# Patient Record
Sex: MALE | Race: WHITE | NOT HISPANIC OR LATINO | Employment: OTHER | URBAN - METROPOLITAN AREA
[De-identification: names, ages, dates, MRNs, and addresses within clinical notes are randomized per-mention and may not be internally consistent; named-entity substitution may affect disease eponyms.]

---

## 2025-06-24 ENCOUNTER — APPOINTMENT (OUTPATIENT)
Dept: ORTHOPEDIC SURGERY | Facility: CLINIC | Age: 65
End: 2025-06-24
Payer: MEDICARE

## 2025-06-30 ENCOUNTER — HOSPITAL ENCOUNTER (EMERGENCY)
Facility: HOSPITAL | Age: 65
Discharge: HOME | End: 2025-06-30
Payer: MEDICARE

## 2025-06-30 ENCOUNTER — APPOINTMENT (OUTPATIENT)
Dept: RADIOLOGY | Facility: HOSPITAL | Age: 65
End: 2025-06-30
Payer: MEDICARE

## 2025-06-30 VITALS
WEIGHT: 165 LBS | DIASTOLIC BLOOD PRESSURE: 81 MMHG | OXYGEN SATURATION: 100 % | TEMPERATURE: 97.3 F | SYSTOLIC BLOOD PRESSURE: 155 MMHG | BODY MASS INDEX: 23.62 KG/M2 | HEIGHT: 70 IN | RESPIRATION RATE: 20 BRPM | HEART RATE: 56 BPM

## 2025-06-30 DIAGNOSIS — S09.90XA CLOSED HEAD INJURY, INITIAL ENCOUNTER: Primary | ICD-10-CM

## 2025-06-30 PROCEDURE — 72125 CT NECK SPINE W/O DYE: CPT | Performed by: RADIOLOGY

## 2025-06-30 PROCEDURE — 72125 CT NECK SPINE W/O DYE: CPT

## 2025-06-30 PROCEDURE — 70450 CT HEAD/BRAIN W/O DYE: CPT

## 2025-06-30 PROCEDURE — 70450 CT HEAD/BRAIN W/O DYE: CPT | Performed by: RADIOLOGY

## 2025-06-30 PROCEDURE — 99284 EMERGENCY DEPT VISIT MOD MDM: CPT | Mod: 25

## 2025-06-30 SDOH — HEALTH STABILITY: MENTAL HEALTH: BEHAVIORAL HEALTH(WDL): EXCEPTIONS TO WDL

## 2025-06-30 SDOH — HEALTH STABILITY: MENTAL HEALTH: BEHAVIORS/MOOD: APPEARS INTOXICATED

## 2025-06-30 ASSESSMENT — PAIN SCALES - GENERAL
PAINLEVEL_OUTOF10: 0 - NO PAIN

## 2025-06-30 ASSESSMENT — LIFESTYLE VARIABLES
PULSE: 56
AGITATION: NORMAL ACTIVITY
NAUSEA AND VOMITING: NO NAUSEA AND NO VOMITING
VISUAL DISTURBANCES: NOT PRESENT
AUDITORY DISTURBANCES: NOT PRESENT
TREMOR: NO TREMOR
HEADACHE, FULLNESS IN HEAD: NOT PRESENT
BLOOD PRESSURE: 155/81
PAROXYSMAL SWEATS: NO SWEAT VISIBLE
ANXIETY: NO ANXIETY, AT EASE
ORIENTATION AND CLOUDING OF SENSORIUM: ORIENTED AND CAN DO SERIAL ADDITIONS
TOTAL SCORE: 0

## 2025-06-30 ASSESSMENT — PAIN DESCRIPTION - PROGRESSION: CLINICAL_PROGRESSION: NOT CHANGED

## 2025-06-30 ASSESSMENT — PAIN - FUNCTIONAL ASSESSMENT: PAIN_FUNCTIONAL_ASSESSMENT: 0-10

## 2025-06-30 NOTE — PROGRESS NOTES
ESTHELA Young, PA-C  Division of Adult Reconstruction  Phone: [phone]  Fax: [phone]    PRIMARY CARE PHYSICIAN: No Assigned PCP Generic Provider, MD  REFERRING PROVIDER: No referring provider defined for this encounter.         JERRY Ratliff is a pleasant 64 y.o. year-old male who is seen today for evaluation of right knee pain. The pain has been present within this last year after he fell directly on the knee at the gym. He rates the pain a 7 out of 10. The patient states that the pain is located in the anterior medial aspect of the knee. The pain is aggravated by activity. The patient has tried activity modifications, Icy hot, and an IA CSI injection at the beginning of April (states this only provided him with 2 weeks of relief) without sufficient relief of symptoms. He does report great instability of the knee and falls often due to this. He ambulates with a cane. The patient denies any history of inflammatory arthritis. The patient denies any numbness or tingling of the side: right lower extremity.    History of knee surgery: No  Functional status: occasionally limited   Instability: Yes  Symptoms impacting sleep: Yes  Impacting quality of life: Yes  Pain level: 7  Back pain: No  Hip/groin pain: No  Radicular symptoms: No    Pertinent PMHx includes: Alcohol abuse with cirrhosis (currently about 3 drinks, 3 days a week) s/p liver transplant, thrombocytopenia, CKD, Hep C virus, DVT, MRSA +, and HTN.    Alcohol Use: Current use with cirrhosis s/p liver transplant in January of 2022  Tobacco Use: No    Review of systems  There has been no interval change in this patient's past medical, surgical, medications, allergies, family history or social history since the most recent visit to a provider within our department. Adult patient history sheet was filled out by the patient today in clinic and will be scanned into the EMR. I personally reviewed this form which will be scanned into the  EMR. 14 point review of systems was performed, reviewed, and negative except for pertinent positives documented in the history of present illness.    Medical History[1]  Problem List[2]  Medication Documentation Review Audit    **Prior to Admission medications have not yet been reviewed**       RX Allergies[3]  Social History     Socioeconomic History    Marital status: Unknown     Spouse name: Not on file    Number of children: Not on file    Years of education: Not on file    Highest education level: Not on file   Occupational History    Not on file   Tobacco Use    Smoking status: Not on file    Smokeless tobacco: Not on file   Substance and Sexual Activity    Alcohol use: Not on file    Drug use: Not on file    Sexual activity: Not on file   Other Topics Concern    Not on file   Social History Narrative    Not on file     Social Drivers of Health     Financial Resource Strain: Not on file   Food Insecurity: No Food Insecurity (5/22/2025)    Received from [facility]    Hunger Vital Sign     Worried About Running Out of Food in the Last Year: Never true     Ran Out of Food in the Last Year: Never true   Transportation Needs: No Transportation Needs (4/28/2025)    Received from [facility]    PRAPARE - Transportation     Lack of Transportation (Medical): No     Lack of Transportation (Non-Medical): No   Physical Activity: Not on file   Stress: Not on file   Social Connections: Not on file   Intimate Partner Violence: Not on file   Housing Stability: Unknown (4/28/2025)    Received from [facility]    Housing Stability Vital Sign     Unable to Pay for Housing in the Last Year: No     Number of Times Moved in the Last Year: Not on file     Homeless in the Last Year: No     Surgical History[4]    Physical Exam  General: Well-appearing male in no acute distress. Awake, alert and oriented. Pleasant and cooperative.  Respiratory: Non-labored breathing  Mood: Euthymic   Gait: Antalgic  Assistive Device:  cane  Skin: warm, dry and intact without lesions    Affected side: right knee:  Tenderness to palpation over the medial and latral joint line.  Effusion: moderate  ROM: 10 - 115  Limb Alignment: Moderate varus  Stable to varus and valgus stress at full extension and 30 degrees of flexion  Quad Strength: 5/5  Hamstring Strength: 5/5  Patella Crepitus: No  Patella Grind Test: No  Sensation: Intact to light touch distally  Motor function: Able to fire TA, EHL, G/S  Pulses: Palpable DP pulse    Imaging  Imaging-4 view xrays of the right knee were independently reviewed and interpreted in clinic today. The findings were reviewed with the patient. There are moderate-severe degenerative changes of the right knee with varus limb alignment. There is Moderate medial joint space narrowing with underlying subchondral sclerosis, and osteophyte formation. No evidence of fracture, AVN, dislocation, osteomyelitis. Chondrocalcinosis present.     Impression/Plan  Garry Ratliff is a 64 y.o. year-old male with Moderate OA of the side: right knee. We reviewed an evidence-based approach to osteoarthritis of the knee. We discussed in detail a treatment plan that He is comfortable with.    I had an in-depth discussion about the nature and natural history of degenerative joint disease. I discussed the etiology of symptoms. I explained that it is progressive, but there is no way to predict how quickly a patient's symptoms will get worse. I discussed non-operative treatments for the patient's osteoarthritis in detail and briefly discussed indications for surgery. I advised that the patient that they can manage their pain symptomatically, with Volataren gel, ice, and Icy-hot. I suggested activity modification as needed when there is a a flare up. I explained to them that the best interventions he  can take to perhaps slow the progression of the degenerative changes in the long-term are maintaining a healthy weight and avoiding joint loading  activities and perform low impact exercise such as cycling, walking, and swimming. The use of a walking stick, cane or other assistive device can help as well.      I also discussed more invasive treatment options including injections. I talked about the risks and benefits of injections, focusing on the fact that there is no way to predict the degree or duration of symptom relief, but that it can provide weeks to months of pain relief in most patients. Should these measures fail, the most invasive option would be joint replacement surgery. The patient should try and delay joint replacement surgery for as long as possible. In addition, his current alcohol use precludes him as a candidate for elective TKA.     Garry Ratliff may one day benefit from an elective total joint replacement however has not yet exhausted other treatment options. I would like the patient to maximize conservative measures at this time as he works on cutting down his alcohol use. I do not see a clear indication to move forward with arthroplasty. I have recommended the following and the patient is in agreement with the plan.  Referral to outpatient PT to work on strengthening and conditioning.  Given the patient's pattern of osteoarthritis, I have recommended a medial  brace.  Patient was prescribed a medial  for right knee osteoarthritis with varus deformity. The patient is ambulatory with or without aid; but, has weakness, instability and/or deformity of their right knee which requires stabilization from this orthosis to improve their function. Verbal and written instructions for the use, wear schedule, cleaning and application of this item were given.  Patient was instructed that should the brace result in increased pain, decreased sensation, increased swelling, or an overall worsening of their medical condition, to please contact our office immediately. Orthotic management and training was provided for skin care,  modifications due to healing tissues, edema changes, interruption in skin integrity, and safety precautions with the orthosis.  Continued conservative management with ice, activity modifications, Voltaren gel.  Provided patient with a card for Dr. Schuler's office to discuss HA injections    All of the patient's questions were answered and he  was comfortable with this plan of care.  Garry Ratliff was encouraged to call if any problems, questions or concerns arise.     Follow-up 1 year PRN with XR of right knee.    ESTHELA Wong, PACoreenC  Orthopaedic Physician Assistant  Division of Adult Reconstruction  Department of Orthopaedics  [place] [address]  Phone: [phone]  Fax: [phone]         [1]   Past Medical History:  Diagnosis Date    Other conditions influencing health status 11/19/2014    Elevated blood pressure   [2] There is no problem list on file for this patient.  [3] Not on File  [4]   Past Surgical History:  Procedure Laterality Date    IR CVC PICC  1/28/2022    IR CVC PICC 1/28/2022    US GUIDED ABDOMINAL PARACENTESIS  12/1/2020    US GUIDED ABDOMINAL PARACENTESIS [facility]

## 2025-07-01 NOTE — ED PROVIDER NOTES
HPI   Chief Complaint   Patient presents with    Alcohol Intoxication       HPI  See my MDM      Patient History   Medical History[1]  Surgical History[2]  Family History[3]  Social History[4]    Physical Exam   ED Triage Vitals [06/30/25 2046]   Temperature Heart Rate Respirations BP   36.3 °C (97.3 °F) 56 20 155/81      Pulse Ox Temp Source Heart Rate Source Patient Position   100 % Oral Monitor Sitting      BP Location FiO2 (%)     Left arm --       Physical Exam  CONSTITUTIONAL: Vital signs reviewed as charted, well-developed and in no distress  Eyes: Extraocular muscles are intact. Pupils equal round and reactive to light. Conjunctiva are pink.    ENT: Mucous membranes are moist. Tongue in the midline. Pharynx was without erythema or exudates, uvula midline  LUNGS: Breath sounds equal and clear to auscultation. Good air exchange, no wheezes rales or retractions, pulse oximetry is charted.  HEART: Regular rate and rhythm without murmur thrill or rub, strong tones, auscultation is normal.  ABDOMEN: Soft and nontender without guarding rebound rigidity or mass. Bowel sounds are present and normal in all quadrants. There is no palpable masses or aneurysms identified. No hepatosplenomegaly, normal abdominal exam.  Neuro: The patient is awake, alert and oriented ×3. Moving all 4 extremities and answering questions appropriately.   MUSCULOSKELETAL: The calves are nontender to palpation. Full gross active range of motion.   PSYCH: Awake alert oriented, normal mood and affect.  Skin:  Dry, normal color, warm to the touch, no rash present.  Patient with some abrasions to the top of his head.      ED Course & MDM   Diagnoses as of 06/30/25 2213   Closed head injury, initial encounter                 No data recorded     Nona Coma Scale Score: 15 (06/30/25 2108 : Vickie Dinh RN)                           Medical Decision Making  History obtained from: patient    Vital signs, nursing notes, current medications, past  medical history, Surgical history, allergies, social history, family History were reviewed.         HPI:  Patient 64-year-old gentleman presenting emergency room today by EMS for evaluation understanding when he fell.  Reportedly EMS sought med control from [facility] who medically pink slipped the patient so he had to come to the emergency room.  Patient admits to drinking 5 beers today.  He does have known history of liver transplant 3 years ago states he normally drinks between 3 and 5 beers daily.  He denies taking anticoagulants.  States he takes his antirejection medications routinely.  He is nontoxic and well-appearing denies loss of consciousness.      10 point ROS was reviewed and negative except Noted above in HPI.  DDX: as listed above          MDM Summary/considerations:  Labs Reviewed - No data to display  CT cervical spine wo IV contrast   Final Result   CT HEAD:   No acute intracranial abnormality or calvarial fracture.   Senescent changes        CT CERVICAL SPINE:   No acute fracture or traumatic malalignment of the cervical spine.   Scattered degenerative changes        Signed by: Damian Sabillon 6/30/2025 9:39 PM   Dictation workstation:   [id number]      CT head wo IV contrast   Final Result   CT HEAD:   No acute intracranial abnormality or calvarial fracture.   Senescent changes        CT CERVICAL SPINE:   No acute fracture or traumatic malalignment of the cervical spine.   Scattered degenerative changes        Signed by: Damian Sabillon 6/30/2025 9:39 PM   Dictation workstation:   [id number]        Medications - No data to display  New Prescriptions    No medications on file       I estimate there is a low risk for subarachnoid hemorrhage, cavernous sinus venous thrombosis, meningitis, intracranial hemorrhage, subdural hematoma, or stroke, thus I considered the discharge disposition reasonable. We have discussed the diagnosis and risks, and we agreed with discharging home to follow-up with her  primary care doctor. We also discussed returning to the emergency department immediately if new or worsening symptoms occur. We have discussed the symptoms which are most concerning such as changing or worsening pain, weakness, vomiting, or fever and necessitate immediate return.      CT scan of the head and neck are grossly unremarkable at this time.  Patient is alert and oriented x 3 answering questions appropriately.  He is able to ambulate without difficulty.  He was discharged home in stable condition with a sober ride.    All of the patient's questions were answered to the best of my ability.  Patient states understanding that they have been screened for an emergency today and we have not found any etiology of symptoms that requires emergent treatment or admission to the hospital at this point. They understand that they have not had definitive care day and require follow-up for treatment of their condition. They also state understanding that they may have an emergent condition that may potentially have not of detected at this visit and they must return to the emergency department if they develop any worsening of symptoms or new complaints.      I have evaluated this patient, my supervising physician was available for consultation.            Critical Care: Not warranted at this time        This chart was completed using voice recognition transcription software. Please excuse any errors of transcription including grammatical, punctuation, syntax and spelling errors.  Please contact me with any questions regarding this chart.    Procedure  Procedures       [1]   Past Medical History:  Diagnosis Date    Other conditions influencing health status 11/19/2014    Elevated blood pressure   [2]   Past Surgical History:  Procedure Laterality Date    IR CVC PICC  1/28/2022    IR CVC PICC 1/28/2022    US GUIDED ABDOMINAL PARACENTESIS  12/1/2020    US GUIDED ABDOMINAL PARACENTESIS [facility]   [3] No family  history on file.  [4]   Social History  Tobacco Use    Smoking status: Not on file    Smokeless tobacco: Not on file   Substance Use Topics    Alcohol use: Not on file    Drug use: Not on file        TIBURCIO Aguilar-CNP  06/30/25 2593

## 2025-07-01 NOTE — DISCHARGE INSTRUCTIONS
Please return to the emergency room immediately if you have new or worsening symptoms. Symptoms of concern include changing or worsening pain, weakness, vomiting, or fever did necessitate immediate return.    Thank you for allowing us to take care of you today. While you are home, you might receive a survey about your care in our hospital. Your nurse and myself, Jono Berumen CNP, would love your honest feedback on your care. Your feedback and especially your positive comments help our hospital receive the support we need to continue to serve you and your family. Thank you again for trusting us with your care.

## 2025-07-01 NOTE — ED TRIAGE NOTES
Pt BIBA EMS after being medically pink-slipped by an [facility]. Per the [facility], the pt is pink-slipped but does not need to be brought to [facility]? Pt self-reports that he is intoxicated and drank about 5 beers total today. Pt also self reports that he did fall and has dried blood in his right eyebrow and some cuts to the back of his head. Pt states he falls quite frequently. Pt is a liver transplant patient, and had his transplant 3 years ago in January. Pt not c/o pain at this time. Pt denies hitting his head.

## 2025-07-02 ENCOUNTER — HOSPITAL ENCOUNTER (OUTPATIENT)
Dept: RADIOLOGY | Facility: CLINIC | Age: 65
Discharge: HOME | End: 2025-07-02
Payer: MEDICARE

## 2025-07-02 ENCOUNTER — APPOINTMENT (OUTPATIENT)
Dept: ORTHOPEDIC SURGERY | Facility: CLINIC | Age: 65
End: 2025-07-02
Payer: MEDICARE

## 2025-07-02 DIAGNOSIS — M25.561 RIGHT KNEE PAIN, UNSPECIFIED CHRONICITY: ICD-10-CM

## 2025-07-02 DIAGNOSIS — M17.11 PRIMARY OSTEOARTHRITIS OF RIGHT KNEE: Primary | ICD-10-CM

## 2025-07-02 PROCEDURE — 99204 OFFICE O/P NEW MOD 45 MIN: CPT

## 2025-07-02 PROCEDURE — 1159F MED LIST DOCD IN RCRD: CPT

## 2025-07-02 PROCEDURE — 73564 X-RAY EXAM KNEE 4 OR MORE: CPT | Mod: RIGHT SIDE | Performed by: STUDENT IN AN ORGANIZED HEALTH CARE EDUCATION/TRAINING PROGRAM

## 2025-07-02 PROCEDURE — 73564 X-RAY EXAM KNEE 4 OR MORE: CPT | Mod: RT

## 2025-08-12 ENCOUNTER — EVALUATION (OUTPATIENT)
Dept: PHYSICAL THERAPY | Facility: CLINIC | Age: 65
End: 2025-08-12
Payer: MEDICARE

## 2025-08-12 DIAGNOSIS — R26.89 BALANCE PROBLEM: ICD-10-CM

## 2025-08-12 DIAGNOSIS — M25.561 ACUTE PAIN OF RIGHT KNEE: Primary | ICD-10-CM

## 2025-08-12 DIAGNOSIS — M17.10 ARTHRITIS OF KNEE: ICD-10-CM

## 2025-08-12 DIAGNOSIS — R26.2 DIFFICULTY WALKING: ICD-10-CM

## 2025-08-12 PROCEDURE — 97162 PT EVAL MOD COMPLEX 30 MIN: CPT | Mod: GP

## 2025-08-18 ENCOUNTER — OFFICE VISIT (OUTPATIENT)
Dept: ORTHOPEDIC SURGERY | Facility: CLINIC | Age: 65
End: 2025-08-18
Payer: MEDICARE

## 2025-08-18 ENCOUNTER — HOSPITAL ENCOUNTER (OUTPATIENT)
Dept: RADIOLOGY | Facility: EXTERNAL LOCATION | Age: 65
Discharge: HOME | End: 2025-08-18

## 2025-08-18 DIAGNOSIS — M17.11 PRIMARY OSTEOARTHRITIS OF RIGHT KNEE: Primary | ICD-10-CM

## 2025-08-18 PROCEDURE — 2500000004 HC RX 250 GENERAL PHARMACY W/ HCPCS (ALT 636 FOR OP/ED): Performed by: FAMILY MEDICINE

## 2025-08-18 PROCEDURE — 1159F MED LIST DOCD IN RCRD: CPT | Performed by: FAMILY MEDICINE

## 2025-08-18 PROCEDURE — 99212 OFFICE O/P EST SF 10 MIN: CPT | Mod: 25

## 2025-08-18 PROCEDURE — 20611 DRAIN/INJ JOINT/BURSA W/US: CPT | Mod: RT | Performed by: FAMILY MEDICINE

## 2025-08-18 PROCEDURE — 99204 OFFICE O/P NEW MOD 45 MIN: CPT | Performed by: FAMILY MEDICINE

## 2025-08-18 RX ORDER — LIDOCAINE HYDROCHLORIDE 10 MG/ML
4 INJECTION, SOLUTION INFILTRATION; PERINEURAL
Status: COMPLETED | OUTPATIENT
Start: 2025-08-18 | End: 2025-08-18

## 2025-08-18 RX ORDER — TRIAMCINOLONE ACETONIDE 40 MG/ML
40 INJECTION, SUSPENSION INTRA-ARTICULAR; INTRAMUSCULAR
Status: COMPLETED | OUTPATIENT
Start: 2025-08-18 | End: 2025-08-18

## 2025-08-18 RX ADMIN — LIDOCAINE HYDROCHLORIDE 4 ML: 10 INJECTION, SOLUTION INFILTRATION; PERINEURAL at 11:36

## 2025-08-18 RX ADMIN — TRIAMCINOLONE ACETONIDE 40 MG: 400 INJECTION, SUSPENSION INTRA-ARTICULAR; INTRAMUSCULAR at 11:36

## 2025-08-26 ENCOUNTER — TREATMENT (OUTPATIENT)
Dept: PHYSICAL THERAPY | Facility: CLINIC | Age: 65
End: 2025-08-26
Payer: MEDICARE

## 2025-08-26 DIAGNOSIS — M17.10 ARTHRITIS OF KNEE: ICD-10-CM

## 2025-08-26 DIAGNOSIS — R26.89 BALANCE PROBLEM: ICD-10-CM

## 2025-08-26 DIAGNOSIS — M25.561 ACUTE PAIN OF RIGHT KNEE: ICD-10-CM

## 2025-08-26 DIAGNOSIS — R26.2 DIFFICULTY WALKING: ICD-10-CM

## 2025-08-26 PROCEDURE — 97110 THERAPEUTIC EXERCISES: CPT | Mod: GP

## 2025-09-02 ENCOUNTER — TREATMENT (OUTPATIENT)
Dept: PHYSICAL THERAPY | Facility: CLINIC | Age: 65
End: 2025-09-02
Payer: MEDICARE

## 2025-09-02 DIAGNOSIS — R26.89 BALANCE PROBLEM: ICD-10-CM

## 2025-09-02 DIAGNOSIS — R26.2 DIFFICULTY WALKING: ICD-10-CM

## 2025-09-02 DIAGNOSIS — M17.10 ARTHRITIS OF KNEE: ICD-10-CM

## 2025-09-02 DIAGNOSIS — M25.561 ACUTE PAIN OF RIGHT KNEE: ICD-10-CM

## 2025-09-02 PROCEDURE — 97110 THERAPEUTIC EXERCISES: CPT | Mod: GP

## 2026-07-01 ENCOUNTER — APPOINTMENT (OUTPATIENT)
Dept: ORTHOPEDIC SURGERY | Facility: CLINIC | Age: 66
End: 2026-07-01
Payer: MEDICARE